# Patient Record
Sex: FEMALE
[De-identification: names, ages, dates, MRNs, and addresses within clinical notes are randomized per-mention and may not be internally consistent; named-entity substitution may affect disease eponyms.]

---

## 2021-01-11 ENCOUNTER — APPOINTMENT (OUTPATIENT)
Dept: OTOLARYNGOLOGY | Facility: CLINIC | Age: 31
End: 2021-01-11
Payer: COMMERCIAL

## 2021-01-11 VITALS
OXYGEN SATURATION: 98 % | DIASTOLIC BLOOD PRESSURE: 66 MMHG | SYSTOLIC BLOOD PRESSURE: 110 MMHG | HEART RATE: 91 BPM | HEIGHT: 61 IN | TEMPERATURE: 98.2 F | BODY MASS INDEX: 24.55 KG/M2 | WEIGHT: 130 LBS

## 2021-01-11 DIAGNOSIS — Z78.9 OTHER SPECIFIED HEALTH STATUS: ICD-10-CM

## 2021-01-11 DIAGNOSIS — Z82.49 FAMILY HISTORY OF ISCHEMIC HEART DISEASE AND OTHER DISEASES OF THE CIRCULATORY SYSTEM: ICD-10-CM

## 2021-01-11 PROCEDURE — 99204 OFFICE O/P NEW MOD 45 MIN: CPT | Mod: 25

## 2021-01-11 PROCEDURE — 92550 TYMPANOMETRY & REFLEX THRESH: CPT

## 2021-01-11 PROCEDURE — 92557 COMPREHENSIVE HEARING TEST: CPT

## 2021-01-11 PROCEDURE — 99072 ADDL SUPL MATRL&STAF TM PHE: CPT

## 2021-01-11 PROCEDURE — 31231 NASAL ENDOSCOPY DX: CPT

## 2021-01-11 RX ORDER — FLUTICASONE FUROATE 27.5 UG/1
27.5 SPRAY, METERED NASAL
Refills: 0 | Status: ACTIVE | COMMUNITY

## 2021-01-11 RX ORDER — FLUTICASONE PROPIONATE 93 UG/1
93 SPRAY, METERED NASAL
Qty: 1 | Refills: 2 | Status: ACTIVE | COMMUNITY
Start: 2021-01-11 | End: 1900-01-01

## 2021-01-11 NOTE — PROCEDURE
[FreeTextEntry6] : We discussed the elevated risk of liberation of viral particles from the nasopharynx if the patient were to be asymptomatically infected with COVID-19; after weighing the risks & benefits the decision was made to proceed. The procedure was performed while wearing appropriate PPE and a camera attached to a video system was used to maximize separation from the patient. The scope was handled appropriately. \par Indication: requirement for exam not possible via anterior rhinoscopy; recurrent sinus complaints\par After verbal consent and the administration of an aerosolized phenylephrine/lidocaine mix, examination was performed with a flexible endoscope. Findings:\par Septum: wide L NSD crowding sidewall structures\par Mucosa: normal\par Polyposis: L grade 1-2 @ mid meatus\par Inferior turbinates: unremarkable\par Middle and superior turbinates: normal\par Inferior meatus: unremarkable\par Middle meatus: as noted\par Superior meatus: unremarkable\par Speno-ethmoidal recess: unremarkable\par Nasopharynx: unremarkable\par Secretions: purulence streaming to the NP from the L sphenoid os\par Other findings: none\par

## 2021-01-11 NOTE — ASSESSMENT
[FreeTextEntry1] : Discussed allergen mitigation and provided the patient with the corresponding educational handout; reviewed proper nasal steroid administration technique.\par Discussed avoidance of otic barotrauma; will follow her tinnitus for now & rpt audio for any worsening. \par Abx, CT, allergy eval & RTC

## 2021-01-11 NOTE — PHYSICAL EXAM
[Nasal Endoscopy Performed] : nasal endoscopy was performed, see procedure section for findings [] : septum deviated to the left [Normal] : no rashes [de-identified] : R nostril w/ mild obstruction by a lateralized MCF

## 2021-01-11 NOTE — HISTORY OF PRESENT ILLNESS
[de-identified] : Over the years has had intermittent nasal dyspnea & allergies which dramatically worsened since 11/20. Now w/ bilat nasal dyspnea, chronic daily midfacial pressure. Irrigates w/ a powered system daily which helps somewhat. She tried flonase for years but it didn't help at all. She saw an allergist sev yrs ago w/ patch testing & was told she's allergic to dust, pollen, cats & others but no AIT. Takes Alleve D to sleep. Treated for sinus infections w/ purulent rhinorrhea ~x2/yr. \par 2 weeks ago she blew her nose forcefully w/ sudden L ear pain & subsequent nonpulsatile L-only tinnitus. Intermittent L>R hearing loss when she feels congested. \par Denies a hx of asthma or asa sensitivity. \par \par Covid-19 screening questions: \par   Sick contacts: no\par   Recent international travel: no\par   Shortness of breath: no\par   New or productive cough: no\par   Fevers/chills/night sweats: no\par   COVID-19 testing: neg swab 11/20

## 2021-01-11 NOTE — CONSULT LETTER
[Dear  ___] : Dear  [unfilled], [Consult Letter:] : I had the pleasure of evaluating your patient, [unfilled]. [Please see my note below.] : Please see my note below. [Consult Closing:] : Thank you very much for allowing me to participate in the care of this patient.  If you have any questions, please do not hesitate to contact me. [Sincerely,] : Sincerely, [FreeTextEntry3] : CASE Hernández Jr, MD, FAAOHNS\par Otolaryngologist\par [facility]

## 2021-01-27 ENCOUNTER — RESULT REVIEW (OUTPATIENT)
Age: 31
End: 2021-01-27

## 2021-01-27 ENCOUNTER — APPOINTMENT (OUTPATIENT)
Dept: CT IMAGING | Facility: CLINIC | Age: 31
End: 2021-01-27
Payer: COMMERCIAL

## 2021-01-27 ENCOUNTER — OUTPATIENT (OUTPATIENT)
Dept: OUTPATIENT SERVICES | Facility: HOSPITAL | Age: 31
LOS: 1 days | End: 2021-01-27

## 2021-01-27 PROCEDURE — 70486 CT MAXILLOFACIAL W/O DYE: CPT | Mod: 26

## 2021-02-01 ENCOUNTER — APPOINTMENT (OUTPATIENT)
Dept: OTOLARYNGOLOGY | Facility: CLINIC | Age: 31
End: 2021-02-01

## 2021-02-08 ENCOUNTER — APPOINTMENT (OUTPATIENT)
Dept: OTOLARYNGOLOGY | Facility: CLINIC | Age: 31
End: 2021-02-08
Payer: COMMERCIAL

## 2021-02-08 VITALS
HEIGHT: 61 IN | HEART RATE: 89 BPM | SYSTOLIC BLOOD PRESSURE: 105 MMHG | TEMPERATURE: 98 F | OXYGEN SATURATION: 99 % | DIASTOLIC BLOOD PRESSURE: 67 MMHG | BODY MASS INDEX: 24.55 KG/M2 | WEIGHT: 130 LBS

## 2021-02-08 PROCEDURE — 99072 ADDL SUPL MATRL&STAF TM PHE: CPT

## 2021-02-08 PROCEDURE — 99214 OFFICE O/P EST MOD 30 MIN: CPT | Mod: 25

## 2021-02-08 PROCEDURE — 31231 NASAL ENDOSCOPY DX: CPT

## 2021-02-16 NOTE — ASSESSMENT
[FreeTextEntry1] : Discussed a septo/FESS and she would strongly like to continue; RTC preop. \par We will follow the ear barotrauma for now since she is improving.

## 2021-02-16 NOTE — HISTORY OF PRESENT ILLNESS
[de-identified] : Over the years has had intermittent nasal dyspnea & allergies which dramatically worsened since 11/20. Now w/ severe bilat nasal dyspnea, chronic daily midfacial pressure. Irrigates w/ a powered system daily which helps somewhat. She tried flonase for years but it didn't help at all; no change with the recent abx. She saw Dr. Godinez w/ skin testing pos & more planned w/ poss AIT. Takes Alleve D to sleep. Treated for sinus infections w/ purulent rhinorrhea ~x2/yr. She plans to start trying to get pregnant & requests surgery. \par 5 weeks ago she blew her nose forcefully w/ sudden L ear pain & subsequent nonpulsatile L-only tinnitus. Intermittent L>R hearing loss when she feels congested. The tinnitus is now only very short lived. \par Denies a hx of asthma or asa sensitivity. \par \par Covid-19 screening questions: \par   Sick contacts: no\par   Recent international travel: no\par   Shortness of breath: no\par   New or productive cough: no\par   Fevers/chills/night sweats: no\par   COVID-19 testing: neg swab 11/20

## 2021-02-16 NOTE — PROCEDURE
[FreeTextEntry6] : We discussed the elevated risk of liberation of viral particles from the nasopharynx if the patient were to be asymptomatically infected with COVID-19; after weighing the risks & benefits the decision was made to proceed. The procedure was performed while wearing appropriate PPE and a camera attached to a video system was used to maximize separation from the patient. The scope was handled appropriately. \par Indication: requirement for exam not possible via anterior rhinoscopy; recurrent sinus complaints\par After verbal consent and the administration of an aerosolized phenylephrine/lidocaine mix, examination was performed with a flexible endoscope. Findings:\par Septum: wide L NSD crowding sidewall structures\par Mucosa: normal\par Polyposis: L MM not well vis\par Inferior turbinates: unremarkable\par Middle and superior turbinates: normal\par Inferior meatus: unremarkable\par Middle meatus: as noted\par Speno-ethmoidal recess: unremarkable\par Nasopharynx: unremarkable\par Secretions: generalized purulence both sides\par Other findings: none\par

## 2021-02-16 NOTE — DATA REVIEWED
[de-identified] : 1/21: brayden CLEMONS [de-identified] : CT sinus 1/21: severe L NSD; pansinus dz prominent in the L max & eth, incl frontals & mild sphenoid mucosal thickening

## 2021-02-16 NOTE — PHYSICAL EXAM
[Nasal Endoscopy Performed] : nasal endoscopy was performed, see procedure section for findings [] : septum deviated to the left [Normal] : the left middle ear was normal [de-identified] : R nostril w/ mild obstruction by a lateralized MCF; severe L NSD

## 2021-02-17 ENCOUNTER — TRANSCRIPTION ENCOUNTER (OUTPATIENT)
Age: 31
End: 2021-02-17

## 2021-02-17 ENCOUNTER — NON-APPOINTMENT (OUTPATIENT)
Age: 31
End: 2021-02-17

## 2021-02-17 ENCOUNTER — APPOINTMENT (OUTPATIENT)
Dept: INTERNAL MEDICINE | Facility: CLINIC | Age: 31
End: 2021-02-17
Payer: COMMERCIAL

## 2021-02-17 VITALS
HEIGHT: 61 IN | OXYGEN SATURATION: 100 % | WEIGHT: 128 LBS | TEMPERATURE: 97.6 F | SYSTOLIC BLOOD PRESSURE: 124 MMHG | DIASTOLIC BLOOD PRESSURE: 74 MMHG | BODY MASS INDEX: 24.17 KG/M2 | HEART RATE: 74 BPM

## 2021-02-17 DIAGNOSIS — Z80.9 FAMILY HISTORY OF MALIGNANT NEOPLASM, UNSPECIFIED: ICD-10-CM

## 2021-02-17 DIAGNOSIS — E01.0 IODINE-DEFICIENCY RELATED DIFFUSE (ENDEMIC) GOITER: ICD-10-CM

## 2021-02-17 DIAGNOSIS — Z82.49 FAMILY HISTORY OF ISCHEMIC HEART DISEASE AND OTHER DISEASES OF THE CIRCULATORY SYSTEM: ICD-10-CM

## 2021-02-17 DIAGNOSIS — Z80.8 FAMILY HISTORY OF MALIGNANT NEOPLASM OF OTHER ORGANS OR SYSTEMS: ICD-10-CM

## 2021-02-17 DIAGNOSIS — Z01.818 ENCOUNTER FOR OTHER PREPROCEDURAL EXAMINATION: ICD-10-CM

## 2021-02-17 PROCEDURE — 36415 COLL VENOUS BLD VENIPUNCTURE: CPT

## 2021-02-17 PROCEDURE — 93000 ELECTROCARDIOGRAM COMPLETE: CPT

## 2021-02-17 PROCEDURE — 99072 ADDL SUPL MATRL&STAF TM PHE: CPT

## 2021-02-17 PROCEDURE — 99204 OFFICE O/P NEW MOD 45 MIN: CPT | Mod: 25

## 2021-02-17 RX ORDER — AMOXICILLIN AND CLAVULANATE POTASSIUM 875; 125 MG/1; MG/1
875-125 TABLET, COATED ORAL
Qty: 20 | Refills: 0 | Status: DISCONTINUED | COMMUNITY
Start: 2021-01-11 | End: 2021-02-17

## 2021-02-17 RX ORDER — IBUPROFEN 200 MG/1
200 TABLET, COATED ORAL
Refills: 0 | Status: DISCONTINUED | COMMUNITY
End: 2021-02-17

## 2021-02-17 NOTE — HISTORY OF PRESENT ILLNESS
[No Pertinent Cardiac History] : no history of aortic stenosis, atrial fibrillation, coronary artery disease, recent myocardial infarction, or implantable device/pacemaker [No Pertinent Pulmonary History] : no history of asthma, COPD, sleep apnea, or smoking [No Adverse Anesthesia Reaction] : no adverse anesthesia reaction in self or family member [(Patient denies any chest pain, claudication, dyspnea on exertion, orthopnea, palpitations or syncope)] : Patient denies any chest pain, claudication, dyspnea on exertion, orthopnea, palpitations or syncope [Chronic Anticoagulation] : no chronic anticoagulation [Chronic Kidney Disease] : no chronic kidney disease [Diabetes] : no diabetes [FreeTextEntry1] : Septo/FESS [FreeTextEntry2] : 2/24/2021 [FreeTextEntry3] : DR Hernández

## 2021-02-17 NOTE — REVIEW OF SYSTEMS
[Negative] : Heme/Lymph [Postnasal Drip] : postnasal drip [Fever] : no fever [Nasal Discharge] : no nasal discharge

## 2021-02-17 NOTE — PHYSICAL EXAM
[No Acute Distress] : no acute distress [Normal Sclera/Conjunctiva] : normal sclera/conjunctiva [No Lymphadenopathy] : no lymphadenopathy [Clear to Auscultation] : lungs were clear to auscultation bilaterally [Normal Rate] : normal rate  [Regular Rhythm] : with a regular rhythm [Pedal Pulses Present] : the pedal pulses are present [No Edema] : there was no peripheral edema [Soft] : abdomen soft [Non Tender] : non-tender [No CVA Tenderness] : no CVA  tenderness [Normal] : affect was normal and insight and judgment were intact [de-identified] : THyromegaly

## 2021-02-18 ENCOUNTER — TRANSCRIPTION ENCOUNTER (OUTPATIENT)
Age: 31
End: 2021-02-18

## 2021-02-18 ENCOUNTER — NON-APPOINTMENT (OUTPATIENT)
Age: 31
End: 2021-02-18

## 2021-02-18 LAB
ALBUMIN SERPL ELPH-MCNC: 4.6 G/DL
ALP BLD-CCNC: 47 U/L
ALT SERPL-CCNC: 7 U/L
ANION GAP SERPL CALC-SCNC: 14 MMOL/L
APPEARANCE: CLEAR
APTT BLD: 35 SEC
AST SERPL-CCNC: 14 U/L
BACTERIA: NEGATIVE
BASOPHILS # BLD AUTO: 0.05 K/UL
BASOPHILS NFR BLD AUTO: 1.2 %
BILIRUB SERPL-MCNC: 0.7 MG/DL
BILIRUBIN URINE: NEGATIVE
BLOOD URINE: ABNORMAL
BUN SERPL-MCNC: 11 MG/DL
CALCIUM SERPL-MCNC: 9.3 MG/DL
CHLORIDE SERPL-SCNC: 104 MMOL/L
CO2 SERPL-SCNC: 22 MMOL/L
COLOR: YELLOW
CREAT SERPL-MCNC: 0.82 MG/DL
EOSINOPHIL # BLD AUTO: 0.38 K/UL
EOSINOPHIL NFR BLD AUTO: 9 %
GLUCOSE QUALITATIVE U: NEGATIVE
GLUCOSE SERPL-MCNC: 95 MG/DL
HCG SERPL QL: NEGATIVE
HCT VFR BLD CALC: 40 %
HGB BLD-MCNC: 13.3 G/DL
HYALINE CASTS: 0 /LPF
IMM GRANULOCYTES NFR BLD AUTO: 0.2 %
INR PPP: 0.92 RATIO
KETONES URINE: NEGATIVE
LEUKOCYTE ESTERASE URINE: NEGATIVE
LYMPHOCYTES # BLD AUTO: 1.57 K/UL
LYMPHOCYTES NFR BLD AUTO: 37 %
MAN DIFF?: NORMAL
MCHC RBC-ENTMCNC: 31.3 PG
MCHC RBC-ENTMCNC: 33.3 GM/DL
MCV RBC AUTO: 94.1 FL
MICROSCOPIC-UA: NORMAL
MONOCYTES # BLD AUTO: 0.33 K/UL
MONOCYTES NFR BLD AUTO: 7.8 %
NEUTROPHILS # BLD AUTO: 1.9 K/UL
NEUTROPHILS NFR BLD AUTO: 44.8 %
NITRITE URINE: NEGATIVE
PAPP-A SERPL-ACNC: <1 MIU/ML
PH URINE: 6.5
PLATELET # BLD AUTO: 298 K/UL
POTASSIUM SERPL-SCNC: 4 MMOL/L
PROT SERPL-MCNC: 6.8 G/DL
PROTEIN URINE: NORMAL
PT BLD: 10.9 SEC
RBC # BLD: 4.25 M/UL
RBC # FLD: 11.3 %
RED BLOOD CELLS URINE: 5 /HPF
SODIUM SERPL-SCNC: 140 MMOL/L
SPECIFIC GRAVITY URINE: 1.02
SQUAMOUS EPITHELIAL CELLS: 13 /HPF
TSH SERPL-ACNC: 1.1 UIU/ML
UROBILINOGEN URINE: NORMAL
WBC # FLD AUTO: 4.24 K/UL
WHITE BLOOD CELLS URINE: 3 /HPF

## 2021-02-19 ENCOUNTER — NON-APPOINTMENT (OUTPATIENT)
Age: 31
End: 2021-02-19

## 2021-02-22 ENCOUNTER — APPOINTMENT (OUTPATIENT)
Dept: OTOLARYNGOLOGY | Facility: CLINIC | Age: 31
End: 2021-02-22
Payer: COMMERCIAL

## 2021-02-22 VITALS
SYSTOLIC BLOOD PRESSURE: 118 MMHG | HEART RATE: 96 BPM | DIASTOLIC BLOOD PRESSURE: 67 MMHG | OXYGEN SATURATION: 98 % | TEMPERATURE: 97.9 F

## 2021-02-22 DIAGNOSIS — H93.12 TINNITUS, LEFT EAR: ICD-10-CM

## 2021-02-22 PROCEDURE — 99214 OFFICE O/P EST MOD 30 MIN: CPT

## 2021-02-22 PROCEDURE — 99072 ADDL SUPL MATRL&STAF TM PHE: CPT

## 2021-02-22 NOTE — PHYSICAL EXAM
[Nasal Endoscopy Performed] : nasal endoscopy was performed, see procedure section for findings [] : septum deviated to the left [de-identified] : R nostril w/ mild obstruction by a lateralized MCF; severe L NSD [de-identified] : mildly overprojected & under-rotated tip; small hump. Mild L midvault collapse w/ narrowed INV. [Normal] : the left middle ear was normal

## 2021-02-22 NOTE — HISTORY OF PRESENT ILLNESS
[de-identified] : Over the years has had intermittent nasal dyspnea & allergies which dramatically worsened since 11/20. Now w/ severe bilat nasal dyspnea, chronic daily midfacial pressure. Irrigates w/ a powered system daily which helps somewhat. She tried flonase for years but it didn't help at all; no change with the recent abx. She saw Dr. Godinez w/ skin testing pos & more planned w/ poss AIT. No longer taking Alleve D d/t the naproxyn component. Treated for sinus infections w/ purulent rhinorrhea ~x2/yr. She plans to start trying to get pregnant & requests surgery. \par Today she voices some concerns about her nasal tip (over projected) and would like to consider a cosmetic addition to her surgery. \par Denies a hx of asthma or asa sensitivity. \par No further ear complaints. \par \par Covid-19 screening questions: \par   Sick contacts: no\par   Recent international travel: no\par   Shortness of breath: no\par   New or productive cough: no\par   Fevers/chills/night sweats: no\par   COVID-19 testing: neg swab 11/20

## 2021-02-22 NOTE — DATA REVIEWED
[de-identified] : CT sinus 1/21: severe L NSD; pansinus dz prominent in the L max & eth, incl frontals & mild sphenoid mucosal thickening  [de-identified] : 1/21: brayden CLEMONS

## 2021-02-22 NOTE — ASSESSMENT
[FreeTextEntry1] : Explained that I don't perform cosmetic SRPs, but this could be combined with her sinus surgery as a joint procedure. Asked her to see Dr. Smith for consideration.

## 2021-02-24 ENCOUNTER — APPOINTMENT (OUTPATIENT)
Dept: OTOLARYNGOLOGY | Facility: CLINIC | Age: 31
End: 2021-02-24
Payer: COMMERCIAL

## 2021-02-24 VITALS — WEIGHT: 128 LBS | TEMPERATURE: 98.7 F | BODY MASS INDEX: 24.17 KG/M2 | HEIGHT: 61 IN

## 2021-02-24 PROCEDURE — 99214 OFFICE O/P EST MOD 30 MIN: CPT

## 2021-02-24 PROCEDURE — 99072 ADDL SUPL MATRL&STAF TM PHE: CPT

## 2021-02-24 NOTE — HISTORY OF PRESENT ILLNESS
[de-identified] : Patient she may Dr. Hernández for long-standing history of bilateral nasal obstruction, allergic rhinitis, facial pressure that did not respond to Flonase or antihistamines. She has had positive allergy testing. Has approximately 2 episodes of sinusitis per year. 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  She is considering options for nasal valve repair and other options as well

## 2021-02-24 NOTE — ASSESSMENT
[FreeTextEntry1] : Multifactorial nasal obstruction. 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 I believe this would be additive to the planned septoplasty, turbinate reduction surgery planned by Dr. Hernández. \par \par The risks, benefits, and alternatives of nasal valve repair was explained to the patient at length and all questions were answered. Discussed the risks as including but not limited to bleeding, infection, need for further surgery, scarring, septal perforation.  We discussed postoperative care in detail including sleeping with head of bed elevation, no nose blowing, cleaning the nostrils and stitches with peroxide on a Q-tip then coating bacitracin.  She will avoid aspirin, ibuprofen, and supplement products for one week prior and after surgery.  She is considering other options as well\par

## 2021-03-03 ENCOUNTER — APPOINTMENT (OUTPATIENT)
Dept: OTOLARYNGOLOGY | Facility: CLINIC | Age: 31
End: 2021-03-03
Payer: COMMERCIAL

## 2021-03-03 VITALS — HEIGHT: 61 IN | WEIGHT: 128 LBS | BODY MASS INDEX: 24.17 KG/M2 | TEMPERATURE: 97.2 F

## 2021-03-03 PROCEDURE — D0165 COSMETIC CONSULT: CPT | Mod: NC

## 2021-03-03 PROCEDURE — 99072 ADDL SUPL MATRL&STAF TM PHE: CPT

## 2021-03-18 DIAGNOSIS — Z01.818 ENCOUNTER FOR OTHER PREPROCEDURAL EXAMINATION: ICD-10-CM

## 2021-03-19 ENCOUNTER — APPOINTMENT (OUTPATIENT)
Dept: DISASTER EMERGENCY | Facility: CLINIC | Age: 31
End: 2021-03-19

## 2021-03-20 LAB — SARS-COV-2 N GENE NPH QL NAA+PROBE: NOT DETECTED

## 2021-03-22 ENCOUNTER — APPOINTMENT (OUTPATIENT)
Dept: DISASTER EMERGENCY | Facility: CLINIC | Age: 31
End: 2021-03-22

## 2021-03-22 ENCOUNTER — LABORATORY RESULT (OUTPATIENT)
Age: 31
End: 2021-03-22

## 2021-03-23 ENCOUNTER — APPOINTMENT (OUTPATIENT)
Dept: OTOLARYNGOLOGY | Facility: CLINIC | Age: 31
End: 2021-03-23
Payer: COMMERCIAL

## 2021-03-23 PROCEDURE — 99072 ADDL SUPL MATRL&STAF TM PHE: CPT

## 2021-03-23 PROCEDURE — 99214 OFFICE O/P EST MOD 30 MIN: CPT

## 2021-03-23 RX ORDER — BACITRACIN 500 [IU]/G
500 OINTMENT TOPICAL
Qty: 1 | Refills: 0 | Status: ACTIVE | COMMUNITY
Start: 2021-03-23 | End: 1900-01-01

## 2021-03-23 RX ORDER — AMOXICILLIN AND CLAVULANATE POTASSIUM 875; 125 MG/1; MG/1
875-125 TABLET, COATED ORAL
Qty: 20 | Refills: 0 | Status: ACTIVE | COMMUNITY
Start: 2021-03-23 | End: 1900-01-01

## 2021-03-23 NOTE — ASSESSMENT
[FreeTextEntry1] : Fully discussed perioperative care and the risks and benefits of sinus surgery, including the possible need for a septoplasty, and turbinoplasty if appropriate; this included but was not limited to cerebrospinal fluid leak, damage to the orbit, the need for more surgery, septal perforation, nosebleed, loss of sense of smell, infection, a saddle nose deformity. The patient expressed understanding and desires to proceed. An educational perioperative care handout was given with instructions for use of her irrigation kit postoperatively. \par [facility] ref # [id number]

## 2021-03-23 NOTE — ASSESSMENT
[FreeTextEntry1] : Fully discussed perioperative care and the risks and benefits of sinus surgery, including the possible need for a septoplasty, and turbinoplasty if appropriate; this included but was not limited to cerebrospinal fluid leak, damage to the orbit, the need for more surgery, septal perforation, nosebleed, loss of sense of smell, infection, a saddle nose deformity. The patient expressed understanding and desires to proceed. An educational perioperative care handout was given with instructions for use of her irrigation kit postoperatively. \par [facility] ref # [id number]

## 2021-03-23 NOTE — HISTORY OF PRESENT ILLNESS
[de-identified] : Telehealth visit\par Visit initiated at the patient or guardian's request. This telehealth audio/visual visit is occurring during the state of emergency due to COVID-19. Governmental regulation is restricting travel and in-person contact and recommends the use of remote activities and telemedicine encounter whenever possible. I discussed with the patient the limitations of telemedicine encounters, including risks associated with the technology platform, technical difficulties, data security and a limited physical exam. There is also a limitation in performing diagnostic procedures and the patient may need further testing and workup to arrive at a diagnosis and treatment plan. We also discussed that this will be billed as an outpatient visit. The following named individual expressed understanding and elected to proceed: \par GWEN TILLMAN at:  6:00PM on 03/23/2021.\par Originating site: the home of the patient in [place], [place]. Participants included: GWEN TILLMAN\par Distant site: the home of Dr. Hernández in [place], [place], who was the only participant at this location\par \par Over the years has had intermittent nasal dyspnea & allergies which dramatically worsened since 11/20. Now w/ severe bilat nasal dyspnea, chronic daily midfacial pressure. Irrigates w/ a powered system daily which helps somewhat. She tried flonase for years but it didn't help at all; no change w/ a trial of abx. She saw Dr. Godinez w/ skin testing pos & more planned w/ poss AIT. No longer taking Alleve D d/t the naproxyn component. Treated for sinus infections w/ purulent rhinorrhea ~x2/yr. She now presents over telemed to preop for septo/turbs/ESS as a joint procedure w/ Dr. Smith who will be doing an open SRP. \par Denies a hx of asthma or asa sensitivity.

## 2021-03-23 NOTE — HISTORY OF PRESENT ILLNESS
[de-identified] : Telehealth visit\par Visit initiated at the patient or guardian's request. This telehealth audio/visual visit is occurring during the state of emergency due to COVID-19. Governmental regulation is restricting travel and in-person contact and recommends the use of remote activities and telemedicine encounter whenever possible. I discussed with the patient the limitations of telemedicine encounters, including risks associated with the technology platform, technical difficulties, data security and a limited physical exam. There is also a limitation in performing diagnostic procedures and the patient may need further testing and workup to arrive at a diagnosis and treatment plan. We also discussed that this will be billed as an outpatient visit. The following named individual expressed understanding and elected to proceed: \par GWEN TILLMAN at:  6:00PM on 03/23/2021.\par Originating site: the home of the patient in [place], [place]. Participants included: GWEN TILLMAN\par Distant site: the home of Dr. Hernández in [place], [place], who was the only participant at this location\par \par Over the years has had intermittent nasal dyspnea & allergies which dramatically worsened since 11/20. Now w/ severe bilat nasal dyspnea, chronic daily midfacial pressure. Irrigates w/ a powered system daily which helps somewhat. She tried flonase for years but it didn't help at all; no change w/ a trial of abx. She saw Dr. Godinez w/ skin testing pos & more planned w/ poss AIT. No longer taking Alleve D d/t the naproxyn component. Treated for sinus infections w/ purulent rhinorrhea ~x2/yr. She now presents over telemed to preop for septo/turbs/ESS as a joint procedure w/ Dr. Smith who will be doing an open SRP. \par Denies a hx of asthma or asa sensitivity.

## 2021-03-23 NOTE — DATA REVIEWED
[de-identified] : 1/21: brayden CLEMONS [de-identified] : CT sinus 1/21: severe L NSD; pansinus dz prominent in the L max & eth, incl frontals & mild sphenoid mucosal thickening

## 2021-03-23 NOTE — DATA REVIEWED
[de-identified] : 1/21: brayden CLEMONS [de-identified] : CT sinus 1/21: severe L NSD; pansinus dz prominent in the L max & eth, incl frontals & mild sphenoid mucosal thickening

## 2021-03-24 ENCOUNTER — APPOINTMENT (OUTPATIENT)
Age: 31
End: 2021-03-24
Payer: SELF-PAY

## 2021-03-24 ENCOUNTER — APPOINTMENT (OUTPATIENT)
Age: 31
End: 2021-03-24
Payer: COMMERCIAL

## 2021-03-24 ENCOUNTER — RESULT REVIEW (OUTPATIENT)
Age: 31
End: 2021-03-24

## 2021-03-24 PROCEDURE — 31298 NSL/SINS NDSC SURG FRNT&SPHN: CPT | Mod: 50

## 2021-03-24 PROCEDURE — 30400A: CUSTOM

## 2021-03-24 PROCEDURE — 30465 REPAIR NASAL STENOSIS: CPT

## 2021-03-24 PROCEDURE — 31256 EXPLORATION MAXILLARY SINUS: CPT | Mod: 50

## 2021-03-24 PROCEDURE — 31255 NSL/SINS NDSC W/TOT ETHMDCT: CPT | Mod: 50

## 2021-03-24 PROCEDURE — 30520 REPAIR OF NASAL SEPTUM: CPT

## 2021-03-24 PROCEDURE — 30802 ABLATE INF TURBINATE SUBMUC: CPT

## 2021-03-24 PROCEDURE — 61782 SCAN PROC CRANIAL EXTRA: CPT

## 2021-03-25 RX ORDER — OXYCODONE AND ACETAMINOPHEN 5; 325 MG/1; MG/1
5-325 TABLET ORAL
Qty: 15 | Refills: 0 | Status: ACTIVE | COMMUNITY
Start: 2021-03-23 | End: 1900-01-01

## 2021-03-30 ENCOUNTER — APPOINTMENT (OUTPATIENT)
Dept: OTOLARYNGOLOGY | Facility: CLINIC | Age: 31
End: 2021-03-30
Payer: COMMERCIAL

## 2021-03-30 VITALS
WEIGHT: 128 LBS | BODY MASS INDEX: 24.17 KG/M2 | HEIGHT: 61 IN | OXYGEN SATURATION: 98 % | TEMPERATURE: 97.7 F | DIASTOLIC BLOOD PRESSURE: 64 MMHG | SYSTOLIC BLOOD PRESSURE: 110 MMHG | HEART RATE: 84 BPM

## 2021-03-30 DIAGNOSIS — J32.4 CHRONIC PANSINUSITIS: ICD-10-CM

## 2021-03-30 DIAGNOSIS — J34.89 OTHER SPECIFIED DISORDERS OF NOSE AND NASAL SINUSES: ICD-10-CM

## 2021-03-30 DIAGNOSIS — Z87.09 PERSONAL HISTORY OF OTHER DISEASES OF THE RESPIRATORY SYSTEM: ICD-10-CM

## 2021-03-30 PROCEDURE — 99024 POSTOP FOLLOW-UP VISIT: CPT

## 2021-03-30 PROCEDURE — 31237 NSL/SINS NDSC SURG BX POLYPC: CPT | Mod: 50,58

## 2021-03-30 NOTE — PROCEDURE
[FreeTextEntry6] : Indication: requirement for postoperative debridement\par After verbal consent and the administration of an aerosolized phenylephrine/lidocaine mix, examination and debridement was performed with a rigid 30 deg endoscope\par septum midline, healing well\par turbs healing well\par middle turbs maintaining medialized position\par debridement performed bilaterally using suction and forceps as required; well-healing antrostomies and ethmoid cavity. Propels left in place\par Poorly tolerated\par

## 2021-03-30 NOTE — PHYSICAL EXAM
[Normal] : normal appearance, well groomed, well nourished, and in no acute distress [de-identified] : external splint & columellar sutures removed; healing very well w/ exellent result so far

## 2021-03-31 ENCOUNTER — APPOINTMENT (OUTPATIENT)
Dept: OTOLARYNGOLOGY | Facility: CLINIC | Age: 31
End: 2021-03-31

## 2021-04-05 ENCOUNTER — APPOINTMENT (OUTPATIENT)
Dept: OTOLARYNGOLOGY | Facility: CLINIC | Age: 31
End: 2021-04-05
Payer: COMMERCIAL

## 2021-04-05 VITALS — TEMPERATURE: 97.4 F | BODY MASS INDEX: 24.17 KG/M2 | HEIGHT: 61 IN | WEIGHT: 128 LBS

## 2021-04-05 VITALS
TEMPERATURE: 97.8 F | HEIGHT: 61 IN | BODY MASS INDEX: 24.17 KG/M2 | WEIGHT: 128 LBS | OXYGEN SATURATION: 100 % | SYSTOLIC BLOOD PRESSURE: 102 MMHG | DIASTOLIC BLOOD PRESSURE: 68 MMHG | HEART RATE: 77 BPM

## 2021-04-05 PROCEDURE — 31237 NSL/SINS NDSC SURG BX POLYPC: CPT | Mod: 50,58

## 2021-04-05 PROCEDURE — 99024 POSTOP FOLLOW-UP VISIT: CPT

## 2021-04-05 PROCEDURE — ZZZZZ: CPT

## 2021-04-05 NOTE — REASON FOR VISIT
[de-identified] : The patient is 12 days status post nasal surgery.  They complain of nasal obstruction bilaterally.  There was no significant bleeding.  The patient reports all postoperative precautions were followed.\par \par Physical exam: Well-healed columella.  Open nasal cavity bilaterally.  The dorsum, which was straight.  The nasal airway had scant mucus.\par \par Assessment/plan: The patient has done well with nasal surgery.  They may resume normal exercise and gentle nose blowing.  They will follow-up in 2 week.  \par \par

## 2021-04-05 NOTE — PHYSICAL EXAM
[Normal] : normal appearance, well groomed, well nourished, and in no acute distress [de-identified] : excellent result so far

## 2021-04-05 NOTE — PROCEDURE
[FreeTextEntry6] : Indication: requirement for postoperative debridement\par After verbal consent and the administration of an aerosolized phenylephrine/lidocaine mix, examination and debridement was performed with a rigid 30 deg endoscope\par septum midline, healing well\par turbs healing well\par middle turbs maintaining medialized position\par debridement performed bilaterally using suction and forceps as required; well-healing antrostomies and ethmoid cavity. Propels removed\par Again this was poorly tolerated\par

## 2021-04-05 NOTE — HISTORY OF PRESENT ILLNESS
[de-identified] : Returns s/p septo/turbs/ESS 3/24/21 (max & eths w/ BS of sphenoids & frontals) w/ an open SRP (by Dr. Smith); doing well without complaints & returns early d/t difficulty w/ the last debridement.

## 2021-04-16 ENCOUNTER — APPOINTMENT (OUTPATIENT)
Dept: OTOLARYNGOLOGY | Facility: CLINIC | Age: 31
End: 2021-04-16
Payer: COMMERCIAL

## 2021-04-16 VITALS
HEART RATE: 86 BPM | BODY MASS INDEX: 24.17 KG/M2 | SYSTOLIC BLOOD PRESSURE: 105 MMHG | WEIGHT: 128 LBS | DIASTOLIC BLOOD PRESSURE: 64 MMHG | TEMPERATURE: 98 F | HEIGHT: 61 IN | OXYGEN SATURATION: 100 %

## 2021-04-16 DIAGNOSIS — Z98.890 OTHER SPECIFIED POSTPROCEDURAL STATES: ICD-10-CM

## 2021-04-16 PROCEDURE — 31237 NSL/SINS NDSC SURG BX POLYPC: CPT | Mod: 50,58

## 2021-04-16 PROCEDURE — 99024 POSTOP FOLLOW-UP VISIT: CPT

## 2021-04-16 PROCEDURE — ZZZZZ: CPT

## 2021-04-16 NOTE — PROCEDURE
[FreeTextEntry6] : Indication: requirement for postoperative debridement\par After verbal consent and the administration of an aerosolized phenylephrine/lidocaine mix, examination and debridement was performed with a rigid 30 deg endoscope\par septum midline, healed well\par turbs healed well\par middle turbs maintaining medialized position\par debridement performed bilaterally using suction and forceps as required; well-healed antrostomies and ethmoid cavity. Early grade 1 polyp recurrence R>L\par Again this was poorly tolerated\par

## 2021-04-16 NOTE — ASSESSMENT
[FreeTextEntry1] : She just resumed Xhance; discussed use & encouraged her to continue her followup w/ allergy. RTC 3 wks sooner prn

## 2021-04-16 NOTE — REASON FOR VISIT
[de-identified] : The patient is 3 weeks status post nasal surgery.  Pleased with results.  There was no significant bleeding.  The patient reports all postoperative precautions were followed.\par \par Physical exam: Sl pink columella. The dorsum was straight.  The nasal airway was clear\par \par Assessment/plan: The patient has done well with nasal surgery.  They may resume normal exercise and gentle nose blowing.  They will follow-up in 2 months SPF on columellar incision\par \par

## 2021-04-16 NOTE — PHYSICAL EXAM
[Normal] : normal appearance, well groomed, well nourished, and in no acute distress [de-identified] : excellent result

## 2021-04-16 NOTE — HISTORY OF PRESENT ILLNESS
[de-identified] : Returns s/p septo/turbs/ESS 3/24/21 (max & eths w/ BS of sphenoids & frontals) w/ an open SRP (by Dr. Smith); doing well without complaints. Irrigating BID.

## 2021-04-30 ENCOUNTER — APPOINTMENT (OUTPATIENT)
Dept: OTOLARYNGOLOGY | Facility: CLINIC | Age: 31
End: 2021-04-30
Payer: COMMERCIAL

## 2021-04-30 VITALS
DIASTOLIC BLOOD PRESSURE: 51 MMHG | OXYGEN SATURATION: 100 % | HEIGHT: 61 IN | SYSTOLIC BLOOD PRESSURE: 105 MMHG | TEMPERATURE: 97.8 F | BODY MASS INDEX: 24.17 KG/M2 | HEART RATE: 71 BPM | WEIGHT: 128 LBS

## 2021-04-30 PROCEDURE — 99024 POSTOP FOLLOW-UP VISIT: CPT

## 2021-04-30 PROCEDURE — 31231 NASAL ENDOSCOPY DX: CPT | Mod: 58

## 2021-04-30 NOTE — PROCEDURE
[FreeTextEntry6] : Indication: requirement for postoperative debridement\par After verbal consent and the administration of an aerosolized phenylephrine/lidocaine mix, examination was performed w/ a flexible endoscope\par septum midline, healed well\par turbs healed well\par middle turbs maintaining medialized position\par widely patent ethmoids & max antra; very small grade 1 L sided polyp\par

## 2021-04-30 NOTE — HISTORY OF PRESENT ILLNESS
[de-identified] : Returns s/p septo/turbs/ESS 3/24/21 (max & eths w/ BS of sphenoids & frontals) w/ an open SRP (by Dr. Smith); doing well without complaints. Irrigating BID & very happy w/ her appearance. Now follows up d/t some difficulty w/ full debridements d/t pt intolerance. Using Xhance daily.

## 2021-04-30 NOTE — PHYSICAL EXAM
[Normal] : normal appearance, well groomed, well nourished, and in no acute distress [de-identified] : excellent result

## 2021-04-30 NOTE — ASSESSMENT
[FreeTextEntry1] : We discussed the actual regression of her small recurrent polyposis w/ resumption of Xhance; she will continue for now. f/u 2 months sooner prn

## 2021-06-29 ENCOUNTER — APPOINTMENT (OUTPATIENT)
Dept: OTOLARYNGOLOGY | Facility: CLINIC | Age: 31
End: 2021-06-29

## 2021-06-30 ENCOUNTER — APPOINTMENT (OUTPATIENT)
Dept: OTOLARYNGOLOGY | Facility: CLINIC | Age: 31
End: 2021-06-30
Payer: COMMERCIAL

## 2021-06-30 PROCEDURE — 99072 ADDL SUPL MATRL&STAF TM PHE: CPT

## 2021-06-30 PROCEDURE — 31231 NASAL ENDOSCOPY DX: CPT

## 2021-06-30 PROCEDURE — 99214 OFFICE O/P EST MOD 30 MIN: CPT | Mod: 25

## 2021-06-30 RX ORDER — AMOXICILLIN AND CLAVULANATE POTASSIUM 875; 125 MG/1; MG/1
875-125 TABLET, COATED ORAL
Qty: 20 | Refills: 0 | Status: ACTIVE | COMMUNITY
Start: 2021-06-30 | End: 1900-01-01

## 2021-06-30 NOTE — HISTORY OF PRESENT ILLNESS
[de-identified] : Patient status post septoplasty, turbinate reduction, nasal valve repair, functional endoscopic sinus surgery March.  Approximately 3 weeks ago she had a sick contact and developed purulent nasal drainage, congestion, facial pain and discomfort. She has not been treated with antibiotics to date. She has recently learned that she's pregnant in her first trimester

## 2021-06-30 NOTE — ASSESSMENT
[FreeTextEntry1] : Acute rhinosinusitis. I recommended a course of Augmentin as she has been symptomatic for 3 weeks. We will continue to check her culture, and make sure she has culture guided therapy. Also recommended irrigations. She will check with her OB/GYN to be sure that Augmentin is an appropriate choice in pregnancy. I asked her to followup with Dr. Hernández in 2 weeks to be sure this infection has cleared. Otherwise she has done well from surgery, FU 6 months.  Photos taken

## 2021-07-01 ENCOUNTER — APPOINTMENT (OUTPATIENT)
Dept: OTOLARYNGOLOGY | Facility: CLINIC | Age: 31
End: 2021-07-01

## 2021-07-06 ENCOUNTER — NON-APPOINTMENT (OUTPATIENT)
Age: 31
End: 2021-07-06

## 2021-07-08 ENCOUNTER — APPOINTMENT (OUTPATIENT)
Dept: OTOLARYNGOLOGY | Facility: CLINIC | Age: 31
End: 2021-07-08
Payer: COMMERCIAL

## 2021-07-08 ENCOUNTER — APPOINTMENT (OUTPATIENT)
Dept: INFECTIOUS DISEASE | Facility: CLINIC | Age: 31
End: 2021-07-08
Payer: COMMERCIAL

## 2021-07-08 VITALS
HEART RATE: 86 BPM | DIASTOLIC BLOOD PRESSURE: 65 MMHG | WEIGHT: 132 LBS | BODY MASS INDEX: 22.26 KG/M2 | OXYGEN SATURATION: 97 % | SYSTOLIC BLOOD PRESSURE: 103 MMHG | TEMPERATURE: 98.1 F | HEIGHT: 64.5 IN

## 2021-07-08 VITALS
DIASTOLIC BLOOD PRESSURE: 70 MMHG | SYSTOLIC BLOOD PRESSURE: 121 MMHG | TEMPERATURE: 97.6 F | HEART RATE: 68 BPM | OXYGEN SATURATION: 99 % | BODY MASS INDEX: 22.26 KG/M2 | HEIGHT: 64.5 IN | WEIGHT: 132 LBS

## 2021-07-08 DIAGNOSIS — J01.01 ACUTE RECURRENT MAXILLARY SINUSITIS: ICD-10-CM

## 2021-07-08 DIAGNOSIS — Z48.89 ENCOUNTER FOR OTHER SPECIFIED SURGICAL AFTERCARE: ICD-10-CM

## 2021-07-08 PROCEDURE — 99213 OFFICE O/P EST LOW 20 MIN: CPT | Mod: 25

## 2021-07-08 PROCEDURE — 99072 ADDL SUPL MATRL&STAF TM PHE: CPT

## 2021-07-08 PROCEDURE — 99205 OFFICE O/P NEW HI 60 MIN: CPT

## 2021-07-08 PROCEDURE — 31231 NASAL ENDOSCOPY DX: CPT

## 2021-07-08 NOTE — PHYSICAL EXAM
[Nasal Endoscopy Performed] : nasal endoscopy was performed, see procedure section for findings [de-identified] : excellent result [Normal] : no rashes

## 2021-07-08 NOTE — PHYSICAL EXAM
[General Appearance - Alert] : alert [Sclera] : the sclera and conjunctiva were normal [Outer Ear] : the ears and nose were normal in appearance [Neck Appearance] : the appearance of the neck was normal [Respiration, Rhythm And Depth] : normal respiratory rhythm and effort [Heart Rate And Rhythm] : heart rate was normal and rhythm regular [Edema] : there was no peripheral edema [Bowel Sounds] : normal bowel sounds [No Palpable Adenopathy] : no palpable adenopathy [Musculoskeletal - Swelling] : no joint swelling [] : no rash [Motor Exam] : the motor exam was normal [Oriented To Time, Place, And Person] : oriented to person, place, and time [FreeTextEntry1] : nose:  left nare with some erythema, clear mucus noted on right

## 2021-07-08 NOTE — HISTORY OF PRESENT ILLNESS
[de-identified] : Returns s/p septo/turbs/ESS 3/24/21 (max & eths w/ BS of sphenoids & frontals) w/ an open SRP (by Dr. Smith); returns after being seen last week w/ purulence and few pseudomonas on cx. Has been on Augmentin with resolution of her sxs & no further discolored rhinorrhea. Saw ID this morning who recommended no further rx. On Xhance. \par IUP @ 6 wks

## 2021-07-08 NOTE — HISTORY OF PRESENT ILLNESS
[FreeTextEntry1] : 31 year old female, 6 weeks pregnant here for evaluation.  Has had chronic sinusitis for many years.  It started in her 20's.  Has tried antibiotics and home remedies over the years. Had sinus surgery (turbinate reduction, nasal valvel repair), septoplasty, and rhinoplasty on 3/24/21.  Post-op she did very well.  \par \par About 1 month ago she developed purulent nasal discharge, congestion, facial pain and discomfort.  She was seen by ENT last week.  Thick purulent mucus was noted on the left and right along with diffuse inflammation in the nasal mucosa.  A culture was sent and was she was started on Augmentin which she is taking.  Culture is now showing pseudomonas aeruginosa, coagulase negative staph and propionibacterium species.  She reports some improvement.  The mucus is now clear.  She does not have fevers, sinus tenderness. She does have a dry mouth and some phlegm in her throat

## 2021-07-08 NOTE — PROCEDURE
[FreeTextEntry6] : Indication: f/u sinus infection, postop\par After verbal consent and the administration of an aerosolized phenylephrine/lidocaine mix, examination was performed w/ a flexible endoscope\par septum midline, healed well\par turbs healed well\par middle turbs maintaining medialized position\par widely patent ethmoids & max antra; very small grade 1 L sided polyp\par no significant secretions\par

## 2021-07-08 NOTE — ASSESSMENT
[FreeTextEntry1] : Doing well; recommended that she complete a 10-day course of the amox/clav and continue daily irrigations. \par RTC 3 months sooner prn

## 2021-07-09 LAB — BACTERIA FLD CULT: ABNORMAL

## 2021-07-12 LAB
ALBUMIN SERPL ELPH-MCNC: 4.9 G/DL
ALP BLD-CCNC: 50 U/L
ALT SERPL-CCNC: 7 U/L
ANION GAP SERPL CALC-SCNC: 15 MMOL/L
AST SERPL-CCNC: 17 U/L
BASOPHILS # BLD AUTO: 0.05 K/UL
BASOPHILS NFR BLD AUTO: 0.8 %
BILIRUB SERPL-MCNC: 0.2 MG/DL
BUN SERPL-MCNC: 14 MG/DL
CALCIUM SERPL-MCNC: 10.4 MG/DL
CHLORIDE SERPL-SCNC: 102 MMOL/L
CO2 SERPL-SCNC: 21 MMOL/L
CREAT SERPL-MCNC: 0.6 MG/DL
EOSINOPHIL # BLD AUTO: 0.08 K/UL
EOSINOPHIL NFR BLD AUTO: 1.3 %
GLUCOSE SERPL-MCNC: 89 MG/DL
HCT VFR BLD CALC: 46.9 %
HGB BLD-MCNC: 14.7 G/DL
IMM GRANULOCYTES NFR BLD AUTO: 0.2 %
LYMPHOCYTES # BLD AUTO: 2.01 K/UL
LYMPHOCYTES NFR BLD AUTO: 32.5 %
MAN DIFF?: NORMAL
MCHC RBC-ENTMCNC: 29.9 PG
MCHC RBC-ENTMCNC: 31.3 GM/DL
MCV RBC AUTO: 95.3 FL
MONOCYTES # BLD AUTO: 0.56 K/UL
MONOCYTES NFR BLD AUTO: 9 %
NEUTROPHILS # BLD AUTO: 3.48 K/UL
NEUTROPHILS NFR BLD AUTO: 56.2 %
PLATELET # BLD AUTO: 347 K/UL
POTASSIUM SERPL-SCNC: 4.5 MMOL/L
PROT SERPL-MCNC: 7.1 G/DL
RBC # BLD: 4.92 M/UL
RBC # FLD: 12.3 %
SODIUM SERPL-SCNC: 138 MMOL/L
WBC # FLD AUTO: 6.19 K/UL

## 2021-12-09 ENCOUNTER — APPOINTMENT (OUTPATIENT)
Dept: OTOLARYNGOLOGY | Facility: CLINIC | Age: 31
End: 2021-12-09
Payer: COMMERCIAL

## 2021-12-09 VITALS
WEIGHT: 165 LBS | HEART RATE: 108 BPM | TEMPERATURE: 97.7 F | SYSTOLIC BLOOD PRESSURE: 143 MMHG | DIASTOLIC BLOOD PRESSURE: 77 MMHG | OXYGEN SATURATION: 97 % | BODY MASS INDEX: 27.83 KG/M2 | HEIGHT: 64.5 IN

## 2021-12-09 DIAGNOSIS — J30.89 OTHER ALLERGIC RHINITIS: ICD-10-CM

## 2021-12-09 PROCEDURE — 99214 OFFICE O/P EST MOD 30 MIN: CPT | Mod: 25

## 2021-12-09 PROCEDURE — 31231 NASAL ENDOSCOPY DX: CPT

## 2021-12-09 RX ORDER — FLUTICASONE PROPIONATE 50 UG/1
50 SPRAY, METERED NASAL
Qty: 1 | Refills: 5 | Status: ACTIVE | COMMUNITY
Start: 2021-12-09 | End: 1900-01-01

## 2021-12-09 RX ORDER — FAMOTIDINE 20 MG/1
20 TABLET, FILM COATED ORAL
Qty: 60 | Refills: 5 | Status: ACTIVE | COMMUNITY
Start: 2021-12-09 | End: 1900-01-01

## 2021-12-09 NOTE — PHYSICAL EXAM
[Nasal Endoscopy Performed] : nasal endoscopy was performed, see procedure section for findings [de-identified] : copious clear; A bloody spot area was noted on the anterior L Kisselbach's plexus which when cleaned began actively bleeding. After topical anesthesia with a lidocaine/oxymetazoline spray and verbal consent, the prominent bleeding vessel(s) were cauterized with AgNO3. This had a good effect and was tolerated well. [Normal] : assessment of respiratory effort is normal

## 2021-12-09 NOTE — HISTORY OF PRESENT ILLNESS
[de-identified] : Returns s/p septo/turbs/ESS 3/24/21 (max & eths w/ BS of sphenoids & frontals) w/ an open SRP (by Dr. Smith); for several weeks she's had worsening sore throat, bloody nasal discharge and some congestion. She had a negative COVID & strep test at an  6d ago; it's still very sore dylan w/ swallowing. Ran out of her fluticasone\par Also has been having severe heartburn unresponsive to Tums. Drinks water at night. \par IUP @ 28 wks

## 2021-12-09 NOTE — PROCEDURE
[FreeTextEntry6] : Indication: nasal drg; postop\par After verbal consent and the administration of an aerosolized phenylephrine/lidocaine mix, examination was performed w/ a flexible endoscope\par septum midline, healed well- bleeder on the L as noted\par turbs healed well\par middle turbs maintaining medialized position\par widely patent ethmoids & max antra\par some mucopurulent secretions bilaterally emerging from open max ostia

## 2021-12-09 NOTE — ASSESSMENT
[FreeTextEntry1] : Continue irrigation, resume flonase. \par \par Explained that her sore throat, which is worst at night and in the morning, is likely reflux related. Reviewed reflux precautions and provided the patient with the corresponding educational handout.\par

## 2021-12-13 ENCOUNTER — APPOINTMENT (OUTPATIENT)
Dept: PULMONOLOGY | Facility: CLINIC | Age: 31
End: 2021-12-13
Payer: COMMERCIAL

## 2021-12-13 ENCOUNTER — APPOINTMENT (OUTPATIENT)
Dept: OTOLARYNGOLOGY | Facility: CLINIC | Age: 31
End: 2021-12-13
Payer: COMMERCIAL

## 2021-12-13 VITALS
OXYGEN SATURATION: 98 % | TEMPERATURE: 98.3 F | HEIGHT: 55 IN | BODY MASS INDEX: 38.18 KG/M2 | SYSTOLIC BLOOD PRESSURE: 117 MMHG | HEART RATE: 74 BPM | WEIGHT: 165 LBS | DIASTOLIC BLOOD PRESSURE: 68 MMHG

## 2021-12-13 VITALS
BODY MASS INDEX: 27.83 KG/M2 | SYSTOLIC BLOOD PRESSURE: 117 MMHG | TEMPERATURE: 96.9 F | OXYGEN SATURATION: 98 % | DIASTOLIC BLOOD PRESSURE: 74 MMHG | HEART RATE: 83 BPM | WEIGHT: 165 LBS | HEIGHT: 64.5 IN

## 2021-12-13 DIAGNOSIS — R06.2 WHEEZING: ICD-10-CM

## 2021-12-13 DIAGNOSIS — D72.10 EOSINOPHILIA, UNSPECIFIED: ICD-10-CM

## 2021-12-13 DIAGNOSIS — K21.9 GASTRO-ESOPHAGEAL REFLUX DISEASE W/OUT ESOPHAGITIS: ICD-10-CM

## 2021-12-13 DIAGNOSIS — J06.9 ACUTE UPPER RESPIRATORY INFECTION, UNSPECIFIED: ICD-10-CM

## 2021-12-13 DIAGNOSIS — Z34.90 ENCOUNTER FOR SUPERVISION OF NORMAL PREGNANCY, UNSPECIFIED, UNSPECIFIED TRIMESTER: ICD-10-CM

## 2021-12-13 DIAGNOSIS — R05.8 OTHER SPECIFIED COUGH: ICD-10-CM

## 2021-12-13 DIAGNOSIS — Z87.09 PERSONAL HISTORY OF OTHER DISEASES OF THE RESPIRATORY SYSTEM: ICD-10-CM

## 2021-12-13 PROCEDURE — 99203 OFFICE O/P NEW LOW 30 MIN: CPT

## 2021-12-13 PROCEDURE — 99214 OFFICE O/P EST MOD 30 MIN: CPT | Mod: 25

## 2021-12-13 PROCEDURE — 31231 NASAL ENDOSCOPY DX: CPT

## 2021-12-13 RX ORDER — BUDESONIDE 180 UG/1
180 AEROSOL, POWDER RESPIRATORY (INHALATION)
Qty: 1 | Refills: 3 | Status: ACTIVE | COMMUNITY
Start: 2021-12-13 | End: 1900-01-01

## 2021-12-13 RX ORDER — LIDOCAINE HYDROCHLORIDE 20 MG/ML
2 SOLUTION ORAL; TOPICAL
Qty: 3 | Refills: 11 | Status: ACTIVE | COMMUNITY
Start: 2021-12-13 | End: 1900-01-01

## 2021-12-13 NOTE — PHYSICAL EXAM
[Nasal Endoscopy Performed] : nasal endoscopy was performed, see procedure section for findings [Normal] : palatine tonsils are normal [Laryngoscopy Performed] : laryngoscopy was performed, see procedure section for findings [de-identified] : mild ttp bilat level 2 [de-identified] : throat cx taken [de-identified] : bilat wheezing & rales at the bases

## 2021-12-13 NOTE — REASON FOR VISIT
[Subsequent Evaluation] : a subsequent evaluation for [FreeTextEntry2] : sore throat, discolored phlegm

## 2021-12-13 NOTE — CONSULT LETTER
[Dear  ___] : Dear  [unfilled], [Consult Letter:] : I had the pleasure of evaluating your patient, [unfilled]. [Please see my note below.] : Please see my note below. [Consult Closing:] : Thank you very much for allowing me to participate in the care of this patient.  If you have any questions, please do not hesitate to contact me. [Sincerely,] : Sincerely, [DrMargo  ___] : Dr. WOODSON [FreeTextEntry3] : Rukhsana Raphael MD\par \par [employer] [facility]\par Pulmonary, Critical Care, and Sleep Medicine\par

## 2021-12-13 NOTE — HISTORY OF PRESENT ILLNESS
[de-identified] : Returns s/p septo/turbs/ESS 3/24/21 (max & eths w/ BS of sphenoids & frontals) w/ an open SRP (by Dr. Smith); she now follows up again with further progression of very sore throat, bloody nasal discharge, discolored phlegm that she seems to be coughing up and some congestion. Swallowing has become very difficult. She is also short of breath with any exertion. Denies F/C/NS. \par Her heartburn is now controlled on famotidine. Following reflux precns. \par Denies a hx of asthma\par IUP @ 29 wks

## 2021-12-13 NOTE — REASON FOR VISIT
[Consultation] : a consultation [Cough] : cough [TextBox_44] : sore throat [TextBox_13] : Dr. Nura Hernández

## 2021-12-13 NOTE — HISTORY OF PRESENT ILLNESS
[Never] : never [TextBox_4] : 32yo woman never smoker  @ 29wks gestation w/ pertinent hx of recurrent sinusitis, strep pharyngitis, and childhood hx of recurrent bronchitis referred for evaluation of productive cough of thick green sputum, sore throat and hx of pseudomonal isolate from nasal discharge. Patient c/o recent onset of intermittent wheezing associated with 2 weeks of cough productive of thick globular green sputa. No associated F/C, malaise, N/V, SOB or WARREN. Has been checked several times for Strep and COVID which were negative during this interval. Also c/o of very severe sore throat that does not respond to OTC remedies in the setting of pregnancy-related GERD. Most of her symptoms have been progressive alongside her pregnancy. \par \par No FHx of bronchiectasis, asthma, cystic fibrosis or other cx pulmonary dz; no personal of FHx of rheumatologic disease\par \par

## 2021-12-13 NOTE — ASSESSMENT
[FreeTextEntry1] : REVIEWED:\par Body fluid culture from 21 (nares) - PsA, Staph epi, Propionibacterium \par \par 30yo woman never smoker  @ 29wks gestation w/ pertinent hx of recurrent sinusitis, strep pharyngitis, and childhood hx of recurrent bronchitis referred by Dr. Hernández for evaluation of productive cough of thick green sputum, sore throat and hx of pseudomonal isolate from nasal discharge. \par \par There are likely several concurrent issues to account for her symptoms. Suspect pregnancy-related GERD causing her severe sore throat based on hx/exam and corroborated by negative throat cultures. Pepcid may be insufficient to control the degree of her reflux and options limited in pregnancy. For this, we suggested she try viscous lidocaine swish and swallow and avoid salt water rinses and Listerine as they can exacerbate her pain. Pseudomonas in the nose in the setting of recurrent sinusitis, productive cough of green sputa, new wheezing, abnormal chest exam and peripheral eosinophilia raises possibility of an underlying related pulmonary process such as bronchiectasis, eosinophilic bronchitis, or even asthma/ABPA. If there is underlying airway disease it has the potential to worsen as she progresses in her pregnancy.  It would be prudent to obtain a CT chest in this regard. Discussed with her at length today particularly in the context of her pregnancy whether she would prefer trying to hold out until her upcoming delivery date vs. obtaining scan sooner. She would prefer to wait until she delivers in March, but will remain in close contact and if symptoms worsen will get sooner. For her wheezing, she was interested in trial of inhaled budesonide twice a day. Will obtain bacterial, fungal and AFB sputa cultures; sputum cups provided. Will defer PFTs at this time. Follow up in 2 weeks.\par \par

## 2021-12-13 NOTE — REVIEW OF SYSTEMS
[Sore Throat] : sore throat [Nasal Congestion] : nasal congestion [Sinus Problems] : sinus problems [Cough] : cough [Sputum] : sputum [Wheezing] : wheezing [GERD] : gerd [Negative] : Endocrine

## 2021-12-13 NOTE — PHYSICAL EXAM
[No Acute Distress] : no acute distress [Normal Oropharynx] : normal oropharynx [Normal Appearance] : normal appearance [Normal Rate/Rhythm] : normal rate/rhythm [Normal S1, S2] : normal s1, s2 [No Resp Distress] : no resp distress [No Abnormalities] : no abnormalities [Normal Gait] : normal gait [No Clubbing] : no clubbing [No Edema] : no edema [Normal Color/ Pigmentation] : normal color/ pigmentation [Oriented x3] : oriented x3 [Normal Affect] : normal affect [TextBox_68] : wheezes, rhonchi and few insp squeaks heard throughout all posterior lung fields

## 2021-12-13 NOTE — PROCEDURE
[FreeTextEntry6] : Indication: nasal drg\par After verbal consent and the administration of an aerosolized phenylephrine/lidocaine mix, examination was performed w/ a rigid endoscope\par septum midline & well-healed\par turbs healed well\par middle turbs maintaining medialized position\par widely patent ethmoids & max antra\par limited mucopurulent secretions posteriorly only on the R [de-identified] : We discussed the elevated risk of liberation of viral particles from the nasopharynx if the patient were to be asymptomatically infected with COVID-19; after weighing the risks & benefits the decision was made to proceed. The procedure was performed while wearing appropriate PPE and a camera attached to a video system was used to maximize separation from the patient. The scope was handled appropriately. \par Indication: requirement for exam not possible via anterior examination; globus\par After verbal consent and NO administration of an aerosolized phenylephrine/lidocaine mix, examination was performed with a flexible endoscope placed through the nose. Findings:\par Nasopharynx: unremarkable\par Soft palate, lateral and posterior pharyngeal walls: unremarkable\par Base of tongue & lingual tonsil: minimal retrodisplacement\par Vallecula: unremarkable\par Epiglottis: unremarkable\par Piriform sinuses and pharyngoesophageal junction: unremarkable\par Arytenoids and AE folds: mild interarytenoid edema\par Ventricle and false vocal folds: unremarkable\par True vocal folds: poorly vis but normal movement bilaterally with good apposition in phonation\par Visible subglottis: not vis\par Other findings: very limited exam (gagging)

## 2021-12-13 NOTE — ASSESSMENT
[FreeTextEntry1] : Case d/w Dr. Raphael who will see her today; offered emotional support and reassurance. Will call w/ results.

## 2021-12-14 ENCOUNTER — TRANSCRIPTION ENCOUNTER (OUTPATIENT)
Age: 31
End: 2021-12-14

## 2021-12-14 LAB
ANION GAP SERPL CALC-SCNC: 14 MMOL/L
BASOPHILS # BLD AUTO: 0.05 K/UL
BASOPHILS NFR BLD AUTO: 0.4 %
BUN SERPL-MCNC: 6 MG/DL
CALCIUM SERPL-MCNC: 8.9 MG/DL
CHLORIDE SERPL-SCNC: 102 MMOL/L
CO2 SERPL-SCNC: 20 MMOL/L
CREAT SERPL-MCNC: 0.6 MG/DL
EOSINOPHIL # BLD AUTO: 0.21 K/UL
EOSINOPHIL NFR BLD AUTO: 1.7 %
ERYTHROCYTE [SEDIMENTATION RATE] IN BLOOD BY WESTERGREN METHOD: 36 MM/HR
GLUCOSE SERPL-MCNC: 65 MG/DL
HCT VFR BLD CALC: 40.3 %
HGB BLD-MCNC: 12.9 G/DL
IMM GRANULOCYTES NFR BLD AUTO: 1.2 %
LYMPHOCYTES # BLD AUTO: 1.93 K/UL
LYMPHOCYTES NFR BLD AUTO: 15.5 %
MAN DIFF?: NORMAL
MCHC RBC-ENTMCNC: 31.6 PG
MCHC RBC-ENTMCNC: 32 GM/DL
MCV RBC AUTO: 98.8 FL
MONOCYTES # BLD AUTO: 0.73 K/UL
MONOCYTES NFR BLD AUTO: 5.8 %
NEUTROPHILS # BLD AUTO: 9.41 K/UL
NEUTROPHILS NFR BLD AUTO: 75.4 %
PLATELET # BLD AUTO: 379 K/UL
POTASSIUM SERPL-SCNC: 4.4 MMOL/L
RBC # BLD: 4.08 M/UL
RBC # FLD: 12.4 %
SODIUM SERPL-SCNC: 135 MMOL/L
WBC # FLD AUTO: 12.48 K/UL

## 2021-12-16 ENCOUNTER — TRANSCRIPTION ENCOUNTER (OUTPATIENT)
Age: 31
End: 2021-12-16

## 2021-12-21 DIAGNOSIS — A49.01 METHICILLIN SUSCEPTIBLE STAPHYLOCOCCUS AUREUS INFECTION, UNSPECIFIED SITE: ICD-10-CM

## 2021-12-21 RX ORDER — CEPHALEXIN 500 MG/1
500 CAPSULE ORAL
Qty: 20 | Refills: 0 | Status: ACTIVE | COMMUNITY
Start: 2021-12-21 | End: 1900-01-01

## 2021-12-22 ENCOUNTER — NON-APPOINTMENT (OUTPATIENT)
Age: 31
End: 2021-12-22

## 2021-12-23 ENCOUNTER — NON-APPOINTMENT (OUTPATIENT)
Age: 31
End: 2021-12-23

## 2021-12-23 LAB
BACTERIA SPT CULT: ABNORMAL
EAR NOSE AND THROAT CULTURE: ABNORMAL

## 2021-12-26 ENCOUNTER — NON-APPOINTMENT (OUTPATIENT)
Age: 31
End: 2021-12-26

## 2022-01-03 ENCOUNTER — NON-APPOINTMENT (OUTPATIENT)
Age: 32
End: 2022-01-03

## 2022-01-12 ENCOUNTER — NON-APPOINTMENT (OUTPATIENT)
Age: 32
End: 2022-01-12

## 2022-01-12 LAB — FUNGUS SPT CULT: ABNORMAL

## 2022-01-31 ENCOUNTER — NON-APPOINTMENT (OUTPATIENT)
Age: 32
End: 2022-01-31

## 2022-02-06 ENCOUNTER — NON-APPOINTMENT (OUTPATIENT)
Age: 32
End: 2022-02-06

## 2022-02-06 LAB — ACID FAST STN SPT: NORMAL

## 2024-09-19 ENCOUNTER — APPOINTMENT (OUTPATIENT)
Dept: OTOLARYNGOLOGY | Facility: CLINIC | Age: 34
End: 2024-09-19
Payer: COMMERCIAL

## 2024-09-19 VITALS
OXYGEN SATURATION: 100 % | HEART RATE: 67 BPM | WEIGHT: 160 LBS | SYSTOLIC BLOOD PRESSURE: 97 MMHG | HEIGHT: 65 IN | DIASTOLIC BLOOD PRESSURE: 55 MMHG | BODY MASS INDEX: 26.66 KG/M2 | TEMPERATURE: 97.7 F

## 2024-09-19 DIAGNOSIS — J30.89 OTHER ALLERGIC RHINITIS: ICD-10-CM

## 2024-09-19 DIAGNOSIS — J01.90 ACUTE SINUSITIS, UNSPECIFIED: ICD-10-CM

## 2024-09-19 DIAGNOSIS — J33.9 NASAL POLYP, UNSPECIFIED: ICD-10-CM

## 2024-09-19 PROCEDURE — 31231 NASAL ENDOSCOPY DX: CPT

## 2024-09-19 PROCEDURE — 99214 OFFICE O/P EST MOD 30 MIN: CPT | Mod: 25

## 2024-09-19 RX ORDER — FLUTICASONE PROPIONATE 93 UG/1
93 SPRAY, METERED NASAL
Qty: 3 | Refills: 1 | Status: ACTIVE | COMMUNITY
Start: 2024-09-19 | End: 1900-01-01

## 2024-09-19 RX ORDER — PREDNISONE 50 MG/1
50 TABLET ORAL
Qty: 5 | Refills: 0 | Status: ACTIVE | COMMUNITY
Start: 2024-09-19 | End: 1900-01-01

## 2024-09-19 RX ORDER — AMOXICILLIN AND CLAVULANATE POTASSIUM 875; 125 MG/1; MG/1
875-125 TABLET, COATED ORAL
Qty: 14 | Refills: 0 | Status: ACTIVE | COMMUNITY
Start: 2024-09-19 | End: 1900-01-01

## 2024-09-19 NOTE — ASSESSMENT
[FreeTextEntry1] : We discussed the recurrence of her polyposis in the absence of ongoing treatment; meds & then RTC

## 2024-09-19 NOTE — PROCEDURE
[FreeTextEntry6] : Indication: requirement for exam not possible via anterior rhinoscopy; sinus infection, postop After verbal consent and the administration of an aerosolized oxymetazoline/lidocaine mix, examination was performed with a flexible endoscope attached to a video monitoring system. Findings: Septum: midline, intact Mucosa: normal Polyposis: R grade 2-3 Inferior turbinates: ok Middle turbinates: reduced on the R; obstructive on the L Inferior meatus: unremarkable Middle meatus: obstructive on the L Speno-ethmoidal recess: purulence on the L Nasopharynx: unremarkable Secretions: purulent on the L Other findings: none

## 2024-09-19 NOTE — HISTORY OF PRESENT ILLNESS
[de-identified] : Returns s/p septo/turbs/ESS 3/24/21 (max & eths w/ BS of sphenoids & frontals) for polyposis w/ an open SRP (by Dr. Smith); she now follows up with thick yellow mucus & facial pressure for 3 days. She irrigates; no abx.  Allergy to dust, mold & possibly others. Ran out of fluticasone. No hx AIT Her heartburn is now mild & not req. treatment.  Denies a hx of asthma

## 2025-04-17 NOTE — HISTORY OF PRESENT ILLNESS
No
[de-identified] : Returns s/p septo/turbs/ESS 3/24/21 (max & eths w/ BS of sphenoids & frontals) w/ an open SRP (by Dr. Smith); doing well without complaints though her nose is a little congested.